# Patient Record
Sex: FEMALE | Race: WHITE | Employment: FULL TIME | URBAN - METROPOLITAN AREA
[De-identification: names, ages, dates, MRNs, and addresses within clinical notes are randomized per-mention and may not be internally consistent; named-entity substitution may affect disease eponyms.]

---

## 2019-05-22 ENCOUNTER — OFFICE VISIT (OUTPATIENT)
Dept: CARDIOLOGY CLINIC | Age: 54
End: 2019-05-22
Payer: COMMERCIAL

## 2019-05-22 VITALS
HEART RATE: 66 BPM | DIASTOLIC BLOOD PRESSURE: 70 MMHG | SYSTOLIC BLOOD PRESSURE: 110 MMHG | WEIGHT: 206 LBS | BODY MASS INDEX: 33.11 KG/M2 | HEIGHT: 66 IN

## 2019-05-22 DIAGNOSIS — Z72.0 TOBACCO USE: ICD-10-CM

## 2019-05-22 DIAGNOSIS — R06.02 SOBOE (SHORTNESS OF BREATH ON EXERTION): Primary | ICD-10-CM

## 2019-05-22 DIAGNOSIS — R94.31 ABNORMAL EKG: ICD-10-CM

## 2019-05-22 DIAGNOSIS — E66.09 NON MORBID OBESITY DUE TO EXCESS CALORIES: ICD-10-CM

## 2019-05-22 PROCEDURE — 93000 ELECTROCARDIOGRAM COMPLETE: CPT | Performed by: INTERNAL MEDICINE

## 2019-05-22 PROCEDURE — 99213 OFFICE O/P EST LOW 20 MIN: CPT | Performed by: INTERNAL MEDICINE

## 2019-05-22 NOTE — PATIENT INSTRUCTIONS
especially good for you. Examples include spinach, carrots, peaches, and berries.     · Foods high in fiber can reduce your cholesterol and provide important vitamins and minerals. High-fiber foods include whole-grain cereals and breads, oatmeal, beans, brown rice, citrus fruits, and apples.     · Limit drinks and foods with added sugar. These include candy, desserts, and soda pop.    Lifestyle changes    · If your doctor recommends it, get more exercise. Walking is a good choice. Bit by bit, increase the amount you walk every day. Try for at least 30 minutes on most days of the week. You also may want to swim, bike, or do other activities.     · Do not smoke. If you need help quitting, talk to your doctor about stop-smoking programs and medicines. These can increase your chances of quitting for good. Quitting smoking may be the most important step you can take to protect your heart. It is never too late to quit. You will get health benefits right away.     · Limit alcohol to 2 drinks a day for men and 1 drink a day for women. Too much alcohol can cause health problems. Medicines    · Take your medicines exactly as prescribed. Call your doctor if you think you are having a problem with your medicine.     · If your doctor recommends aspirin, take the amount directed each day. Make sure you take aspirin and not another kind of pain reliever, such as acetaminophen (Tylenol). If you take ibuprofen (such as Advil or Motrin) for other problems, take aspirin at least 2 hours before taking ibuprofen. When should you call for help? Call 911 if you have symptoms of a heart attack.  These may include:    · Chest pain or pressure, or a strange feeling in the chest.     · Sweating.     · Shortness of breath.     · Pain, pressure, or a strange feeling in the back, neck, jaw, or upper belly or in one or both shoulders or arms.     · Lightheadedness or sudden weakness.     · A fast or irregular heartbeat.    After you call 911, the  may tell you to chew 1 adult-strength or 2 to 4 low-dose aspirin. Wait for an ambulance. Do not try to drive yourself.   Watch closely for changes in your health, and be sure to contact your doctor if you have any problems. Where can you learn more? Go to [email or web address] and sign in to your [employer] account. Enter C228 in the [employer]media box to learn more about \"A Healthy Heart: Care Instructions. \"     If you do not have an account, please click on the \"Sign Up Now\" link. Current as of: July 22, 2018  Content Version: 12.0  © 7395-5598 Healthwise, Incorporated. Care instructions adapted under license by [facility] ([facility]). If you have questions about a medical condition or this instruction, always ask your healthcare professional. [address] any warranty or liability for your use of this information.

## 2020-09-02 ENCOUNTER — OFFICE VISIT (OUTPATIENT)
Dept: CARDIOLOGY CLINIC | Age: 55
End: 2020-09-02
Payer: COMMERCIAL

## 2020-09-02 VITALS
BODY MASS INDEX: 43.2 KG/M2 | WEIGHT: 268.8 LBS | DIASTOLIC BLOOD PRESSURE: 80 MMHG | RESPIRATION RATE: 16 BRPM | HEART RATE: 81 BPM | SYSTOLIC BLOOD PRESSURE: 122 MMHG | OXYGEN SATURATION: 96 % | HEIGHT: 66 IN

## 2020-09-02 PROCEDURE — 93000 ELECTROCARDIOGRAM COMPLETE: CPT | Performed by: INTERNAL MEDICINE

## 2020-09-02 PROCEDURE — 99214 OFFICE O/P EST MOD 30 MIN: CPT | Performed by: INTERNAL MEDICINE

## 2020-09-02 RX ORDER — FUROSEMIDE 20 MG/1
20 TABLET ORAL DAILY
Qty: 30 TABLET | Refills: 3 | Status: SHIPPED
Start: 2020-09-02 | End: 2021-02-05

## 2020-09-02 NOTE — PROGRESS NOTES
 Smokeless tobacco: Never Used   Substance Use Topics    Alcohol use: Yes     Comment: occasional mixed drink;  denies caffeine         Review of Systems:  Constitutional: negative for fever and chills, or significant weight loss  HEENT: negative for acute visual symptoms or auditory problems, no dysphagia  Respiratory: negative for cough, wheezing, or hemoptysis  Cardiovascular: negative for chest pain, palpitations, and +ve dyspnea  Gastrointestinal: negative for abdominal pain, diarrhea, nausea and vomiting  Endocrine: Negative for polyuria and polydyspsia  Genitourinary:negative for dysuria and hematuria  Derm: negative for rash and skin lesion(s)  Neurological: negative for tingling, numbness, weakness, seizures and tremors  Endocrine: negative for polydipsia and polyuria  Musculoskeletal: negative for pain or tenderness  Psychiatric: negative for anxiety, depression, or suicidal ideations         O:  COMPLETE PHYSICAL EXAM:       /80 (Site: Right Upper Arm, Position: Sitting, Cuff Size: Large Adult)   Pulse 81   Resp 16   Ht 5' 6\" (1.676 m)   Wt 268 lb 12.8 oz (121.9 kg)   SpO2 96%   BMI 43.39 kg/m²       General:   Patient alert, comfortable, no distress. Appears stated age. HEENT:    Pupils equal, no icterus, no nasal drainage, tongue moist.   Neck:              No masses, Thyroid not palpable. No elevated JVD, No carotid bruit. Chest:   Normal configuration, non tender. Lungs:   Clear to auscultation bilaterally, few scattered rhonchi. Cardiovascular:  Regular rhythm, 1/6 systolic murmur, No S3, PMI at 5th ICS, no palpable thrills,    Abdomen:  Soft, Non tender, Bowel sounds normal, no pulsatile abdominal aorta. Extremities:  1-2+ edema. Distal pulses palpable. No cyanosis, no clubbing. Skin:   Good turgor, warm and dry, no cyanosis. Musculoskeletal: No joint swelling or deformity. Neuro:   Cranial nerves grossly intact; No focal neurologic deficit.    Psych:   Alert, good mood and effect. REVIEW OF DIAGNOSTIC TESTS:        Electrocardiogram: Reviewed             A/P:   ASSESSMENT / PLAN:    Maria T Bernard was seen today for 1 year follow up, edema and shortness of breath. Diagnoses and all orders for this visit:    SOBOE (shortness of breath on exertion) - No acute CHF  -     EKG 12 lead  -     Echo 2D w doppler w color complete; Future  -     Basic Metabolic Panel    Edema of both feet - Elevate feet, decrease salt - Start Lasix 20mg   -     Echo 2D w doppler w color complete; Future  -     Basic Metabolic Panel    Abnormal EKG    Tobacco use - Counseled to quit smoking    Morbid obesity with BMI of 40.0-44.9, adult (HCC) - Diet, exercise and weight loss discussed. Other orders  -     furosemide (LASIX) 20 MG tablet; Take 1 tablet by mouth daily    Occ heart skipping  - Decrease caffeine - Call if more frequent symptoms - then holter/Event monitor     Preventive Cardiology: Low cholesterol diet, regular exercise as tolerate, and gradual weight loss discussed. Monitor BP and heart rates. Above recommendations discussed with her. All questions answered about cardiac diagnoses and cardiac medications. Continue current medications. Compliance with medications and f/u with all physicians discussed. Risk factor modification based on risk profile discussed. Call if any exertional chest pain, short of breath, dizzy or palpitations   Follow up in 1 month or earlier if needed.          [facility]  [address], [place], [address] [facility]  [phone]

## 2020-09-23 ENCOUNTER — TELEPHONE (OUTPATIENT)
Dept: CARDIOLOGY | Age: 55
End: 2020-09-23

## 2021-02-05 RX ORDER — FUROSEMIDE 20 MG/1
TABLET ORAL
Qty: 30 TABLET | Refills: 3 | Status: SHIPPED
Start: 2021-02-05 | End: 2021-06-24

## 2021-06-24 RX ORDER — FUROSEMIDE 20 MG/1
TABLET ORAL
Qty: 30 TABLET | Refills: 3 | Status: SHIPPED
Start: 2021-06-24 | End: 2022-07-11 | Stop reason: SDUPTHER

## 2022-07-11 ENCOUNTER — OFFICE VISIT (OUTPATIENT)
Dept: CARDIOLOGY CLINIC | Age: 57
End: 2022-07-11
Payer: COMMERCIAL

## 2022-07-11 VITALS
BODY MASS INDEX: 43.65 KG/M2 | RESPIRATION RATE: 16 BRPM | OXYGEN SATURATION: 97 % | SYSTOLIC BLOOD PRESSURE: 128 MMHG | WEIGHT: 271.6 LBS | DIASTOLIC BLOOD PRESSURE: 62 MMHG | HEIGHT: 66 IN | HEART RATE: 75 BPM

## 2022-07-11 DIAGNOSIS — R60.0 EDEMA OF BOTH FEET: ICD-10-CM

## 2022-07-11 DIAGNOSIS — Z72.0 TOBACCO USE: ICD-10-CM

## 2022-07-11 DIAGNOSIS — R07.9 CHEST PAIN, UNSPECIFIED TYPE: Primary | ICD-10-CM

## 2022-07-11 DIAGNOSIS — R94.31 ABNORMAL EKG: ICD-10-CM

## 2022-07-11 DIAGNOSIS — R06.09 DYSPNEA ON EXERTION: ICD-10-CM

## 2022-07-11 PROCEDURE — 93000 ELECTROCARDIOGRAM COMPLETE: CPT | Performed by: INTERNAL MEDICINE

## 2022-07-11 PROCEDURE — 99214 OFFICE O/P EST MOD 30 MIN: CPT | Performed by: INTERNAL MEDICINE

## 2022-07-11 RX ORDER — ASPIRIN 81 MG/1
81 TABLET ORAL DAILY
Qty: 90 TABLET | Refills: 1
Start: 2022-07-11

## 2022-07-11 RX ORDER — NITROGLYCERIN 0.4 MG/1
0.4 TABLET SUBLINGUAL EVERY 5 MIN PRN
Qty: 25 TABLET | Refills: 1 | Status: SHIPPED | OUTPATIENT
Start: 2022-07-11

## 2022-07-11 RX ORDER — FUROSEMIDE 20 MG/1
TABLET ORAL
Qty: 30 TABLET | Refills: 3 | Status: SHIPPED | OUTPATIENT
Start: 2022-07-11

## 2022-07-11 NOTE — PATIENT INSTRUCTIONS
Call with test resutls  Take Aspirin 81mg daily  Cut back on salt, elevate feet  Call for chest pain on activity  Go to ER if chest pain gets worse of need >3 s/l for one episode of chest pain

## 2022-07-11 NOTE — PROGRESS NOTES
OFFICE VISIT     PRIMARY CARE PHYSICIAN:      Ambrosio Oropeza DO       ALLERGIES / SENSITIVITIES:      No Known Allergies       REVIEWED MEDICATIONS:        Current Outpatient Medications:     furosemide (LASIX) 20 MG tablet, take 1 tablet by mouth once daily, Disp: 30 tablet, Rfl: 3    aspirin EC 81 MG EC tablet, Take 1 tablet by mouth daily, Disp: 90 tablet, Rfl: 1    nitroGLYCERIN (NITROSTAT) 0.4 MG SL tablet, Place 1 tablet under the tongue every 5 minutes as needed for Chest pain, Disp: 25 tablet, Rfl: 1    vitamin D (ERGOCALCIFEROL) 92321 UNITS CAPS capsule, Take 50,000 Units by mouth once a week, Disp: , Rfl:     desvenlafaxine succinate (PRISTIQ) 50 MG TB24 extended release tablet, Take 50 mg by mouth daily (Patient not taking: Reported on 7/11/2022), Disp: , Rfl:       S: REASON FOR VISIT:       Chief Complaint   Patient presents with    Chest Pain     2 year ov. Is c/o chest pain & upper back pain. Is also c/o SOBOE & some LE edema. Labs @ PCP's ofc; obtaining.  Back Pain    Shortness of Breath    Edema          History of Present Illness:       Office Visit for follow up of Abnormal EKG, chest pain, SOB   64year old with history of abnormal EKG, edema feet, tobacco use, Obesity came for f/u visit   Last seen in 9/2020, for edema and SOB; Started on Lasix and 2D Echo ordered- Not done     C/O SOBOE and edema feet - No Orthopnea, sleeps on one pillow   C/o CP and upper back pain, like\" Knot\". Constant pain, at rest, no radiation, no associated Sx; relieve on its own   Her CP or back pain will not change with activity    Her recent labs from April 2022 noted. No hospitalizations or surgeries since last visit   Patient, states, compliant with her medications   Kash any exertional chest pain   No palpitations, dizzy or syncope.    Active at home   Try to watch diet          Past Medical History:   Diagnosis Date    Anxiety     Depression     Obesity     Tobacco abuse Past Surgical History:   Procedure Laterality Date     SECTION      x2    TONSILLECTOMY      age 11          Family History   Problem Relation Age of Onset    Diabetes Mother     Dementia Mother     Lung Cancer Father     Other Sister         mass on heart; mini stroke x2; had open heart @ [facility] at age 48   [place] No Known Problems Sister     Heart Attack Paternal Grandmother     Heart Attack Paternal Grandfather           Social History     Tobacco Use    Smoking status: Current Every Day Smoker     Packs/day: 0.75     Years: 42.00     Pack years: 31.50     Types: Cigarettes    Smokeless tobacco: Never Used   Substance Use Topics    Alcohol use: Yes     Comment: rare         Review of Systems:    Constitutional: negative for fever and chills, or significant weight loss  HEENT: negative for acute visual symptoms or auditory problems, no dysphagia  Respiratory: negative for cough, wheezing, or hemoptysis  Cardiovascular: +ve for chest pain and dyspnea  Gastrointestinal: negative for abdominal pain, diarrhea, melena, nausea and vomiting  Endocrine: Negative for polyuria and polydyspsia  Genitourinary: negative for dysuria and hematuria  Derm: negative for rash and skin lesion(s)  Neurological: negative for tingling, numbness, weakness, seizures  Endocrine: negative for polydipsia and polyuria  Musculoskeletal: negative for pain or tenderness  Psychiatric: negative for anxiety, depression, or suicidal ideations         O:  COMPLETE PHYSICAL EXAM:       /62   Pulse 75   Resp 16   Ht 5' 6\" (1.676 m)   Wt 271 lb 9.6 oz (123.2 kg)   SpO2 97%   BMI 43.84 kg/m²       General:   Patient alert, comfortable, no distress. Appears stated age. HEENT:    Pupils equal, no icterus; Tongue moist.   Neck:              No masses, Thyroid not palpable. No elevated JVD, No carotid bruit. Chest:   Normal configuration, non tender.    Lungs:   Clear to auscultation bilaterally except few scattered rhonchi. Cardiovascular:  Regular rhythm, 6-8/7 systolic murmur, No S3, no palpable thrills. Abdomen:  Soft, Bowel sounds normal, obese, can not feel pulsatile abdominal aorta. Extremities:  +edema. Distal pulses palpable. No cyanosis. Skin:   Good turgor, warm and dry, no cyanosis. Musculoskeletal: No joint swelling or deformity. Neuro:   Cranial nerves grossly intact; No focal neurologic deficit. Psych:   Alert, good mood and effect. REVIEW OF DIAGNOSTIC TESTS:        Electrocardiogram: Reviewed      Labs:  Reviewed from 4/2022 K+ 4.5              ASSESSMENT / PLAN:    Zhen Castellanos was seen today for chest pain, back pain, shortness of breath and edema. Diagnoses and all orders for this visit:    Chest pain, unspecified type - Atypical, mostly at rest; ASA 81mg, s/l Nitro prn  -     EKG 12 Lead  -     Echo 2D w doppler w color complete; Future  -     NM Cardiac Stress Test Nuclear Imaging; Future  -     Cardiac Stress Test - w/Pharm; Future    Abnormal EKG  -     Echo 2D w doppler w color complete; Future  -     NM Cardiac Stress Test Nuclear Imaging; Future  -     Cardiac Stress Test - w/Pharm; Future    Dyspnea on exertion - No acute CHF  -     Echo 2D w doppler w color complete; Future  -     NM Cardiac Stress Test Nuclear Imaging; Future  -     Cardiac Stress Test - w/Pharm; Future    Edema of both feet - Chronic - Resume low dose Lasix; Normal K+ on recent labs. Elevated feet, decrease salt intake    Tobacco use - Counseled to quit smoking    BMI 40.0-44.9, adult (HCC) - Diet, exercise and weight loss discussed. Need to r/o NILSON - she will talk to Dr Luigi Campos Cardiology: Low cholesterol diet, regular exercise as tolerate, and gradual weight loss discussed.     Other orders  -     Beta Blocker Management Prior to Cardiac Stress Test; Standing  -     regadenoson (LEXISCAN) injection 0.4 mg  -     Beta Blocker Management Prior to Cardiac Stress Test  -     furosemide (LASIX) 20 MG tablet; take 1 tablet by mouth once daily  -     aspirin EC 81 MG EC tablet; Take 1 tablet by mouth daily  -     nitroGLYCERIN (NITROSTAT) 0.4 MG SL tablet; Place 1 tablet under the tongue every 5 minutes as needed for Chest pain     Above recommendations discussed. The patient's current medication list, allergies, problem list and results of prior tests (as available) were reviewed at today's visit   All questions answered about cardiac diagnoses and cardiac medications. Continue current medications. Monitor BP and heart rates. Compliance with medications and f/u with all physicians discussed. Risk factor modification based on risk profile discussed. Call if any exertional chest pain, short of breath, dizzy or palpitations. Follow up in 6 weeks or earlier if needed.          [facility]  [address], [place], [address] [place] [address]  [phone]

## 2022-07-13 ENCOUNTER — HOSPITAL ENCOUNTER (OUTPATIENT)
Dept: CARDIOLOGY | Age: 57
Discharge: HOME | End: 2022-07-13
Payer: COMMERCIAL

## 2022-07-13 ENCOUNTER — TELEPHONE (OUTPATIENT)
Dept: CARDIOLOGY CLINIC | Age: 57
End: 2022-07-13

## 2022-07-13 VITALS
HEIGHT: 66 IN | SYSTOLIC BLOOD PRESSURE: 150 MMHG | WEIGHT: 271 LBS | BODY MASS INDEX: 43.55 KG/M2 | HEART RATE: 77 BPM | DIASTOLIC BLOOD PRESSURE: 90 MMHG

## 2022-07-13 DIAGNOSIS — R94.31 ABNORMAL EKG: ICD-10-CM

## 2022-07-13 DIAGNOSIS — R06.09 DYSPNEA ON EXERTION: ICD-10-CM

## 2022-07-13 DIAGNOSIS — R07.9 CHEST PAIN, UNSPECIFIED TYPE: ICD-10-CM

## 2022-07-13 LAB
LV EF: 60 %
LVEF MODALITY: NORMAL

## 2022-07-13 PROCEDURE — 2580000003 HC RX 258: Performed by: INTERNAL MEDICINE

## 2022-07-13 PROCEDURE — C8929 TTE W OR WO FOL WCON,DOPPLER: HCPCS

## 2022-07-13 PROCEDURE — 6360000004 HC RX CONTRAST MEDICATION: Performed by: INTERNAL MEDICINE

## 2022-07-13 PROCEDURE — 6360000002 HC RX W HCPCS: Performed by: INTERNAL MEDICINE

## 2022-07-13 PROCEDURE — 93017 CV STRESS TEST TRACING ONLY: CPT

## 2022-07-13 PROCEDURE — 78452 HT MUSCLE IMAGE SPECT MULT: CPT

## 2022-07-13 PROCEDURE — A9502 TC99M TETROFOSMIN: HCPCS | Performed by: INTERNAL MEDICINE

## 2022-07-13 PROCEDURE — 3430000000 HC RX DIAGNOSTIC RADIOPHARMACEUTICAL: Performed by: INTERNAL MEDICINE

## 2022-07-13 RX ORDER — SODIUM CHLORIDE 0.9 % (FLUSH) 0.9 %
10 SYRINGE (ML) INJECTION PRN
Status: DISCONTINUED | OUTPATIENT
Start: 2022-07-13 | End: 2022-07-14 | Stop reason: HOSPADM

## 2022-07-13 RX ADMIN — SODIUM CHLORIDE, PRESERVATIVE FREE 10 ML: 5 INJECTION INTRAVENOUS at 09:45

## 2022-07-13 RX ADMIN — SODIUM CHLORIDE, PRESERVATIVE FREE 10 ML: 5 INJECTION INTRAVENOUS at 10:13

## 2022-07-13 RX ADMIN — TETROFOSMIN 8.4 MILLICURIE: 0.23 INJECTION, POWDER, LYOPHILIZED, FOR SOLUTION INTRAVENOUS at 08:20

## 2022-07-13 RX ADMIN — REGADENOSON 0.4 MG: 0.08 INJECTION, SOLUTION INTRAVENOUS at 10:12

## 2022-07-13 RX ADMIN — SODIUM CHLORIDE, PRESERVATIVE FREE 10 ML: 5 INJECTION INTRAVENOUS at 10:12

## 2022-07-13 RX ADMIN — TETROFOSMIN 28.8 MILLICURIE: 0.23 INJECTION, POWDER, LYOPHILIZED, FOR SOLUTION INTRAVENOUS at 10:12

## 2022-07-13 RX ADMIN — SODIUM CHLORIDE, PRESERVATIVE FREE 10 ML: 5 INJECTION INTRAVENOUS at 08:20

## 2022-07-13 RX ADMIN — PERFLUTREN 1.1 MG: 6.52 INJECTION, SUSPENSION INTRAVENOUS at 09:44

## 2022-07-13 RX ADMIN — SODIUM CHLORIDE, PRESERVATIVE FREE 10 ML: 5 INJECTION INTRAVENOUS at 09:44

## 2022-07-13 ASSESSMENT — PAIN DESCRIPTION - LOCATION: LOCATION: BACK

## 2022-07-13 NOTE — PROCEDURES
[id number] [address] - [address]. [facility] ([place] [address] [place], [place] [phone]               Pharmacologic Stress Nuclear Gated SPECT Study    Name: Indigeo Virtus HODA Account Number: [de-identified]    :  1965          Sex: female         Date of Study:  2022    Height: 5' 6\" (167.6 cm)         Weight: 271 lb (122.9 kg)     Ordering Provider: Corinna Pollock MD          PCP: Giovanni Darden DO      Cardiologist: Corinna Pollock MD             Interpreting Physician: Corinna Pollock MD  _________________________________________________________________________________    Indication: Chest pain, abnormal EKG  Detecting the presence and location of coronary artery disease    Clinical History:   Patient has no known history of coronary artery disease. Resting ECG:  Sinus rhythm, Anterior T inversion      Procedure:   Pharmacologic stress testing was performed with regadenoson 0.4 mg for 15 seconds. The heart rate was 77 at baseline and jomar to 108 beats during the infusion. The blood pressure at baseline was 150/90 and blood pressure at the end of infusion was 130/78. Blood pressure response was normal during the stress procedure. The patient experienced mild shortness of breath only  during the infusion. ECG during the infusion did not change. IMAGING: Myocardial perfusion imaging was performed at rest 30-35 minutes following the intravenous injection of 8.5 mCi of (Tc-tetrofosmin) followed by 10 ml of Normal Saline. As per infusion protocol, the patient was injected intravenously with 28.8 mCi of (Tc-tetrofosmin) followed by 10 ml of Normal Saline. Gated post-stress tomographic imaging was performed 45 minutes after stress. FINDINGS: The overall quality of the study was good.      Left ventricular cavity size was noted to be normal.    Rotational analog analysis demonstrated no patient motion or abnormal extracardiac radioactivity. The gated SPECT stress imaging and rest SPECT imaging in the short, vertical long, and horizontal long axis demonstrated normal homogeneous tracer distribution throughout the myocardium both on the post regadenoson and resting images. Gated SPECT left ventricular ejection fraction was calculated to be 75%, with normal myocardial thickening and wall motion. Impression:    1. Electrocardiographically normal regadenoson infusion with a clinically nonischemic response. 2. Myocardial perfusion imaging was normal.    3. Overall left ventricular systolic function was normal without regional wall motion abnormalities. EF 75%. 4.   Low risk general pharmacologic stress test    No prior study to compare. Thank you for sending your patient to this [employer].      Electronically signed by Maria Elena Khanna MD on 7/13/2022 at 2:26 PM

## 2022-08-12 ENCOUNTER — OFFICE VISIT (OUTPATIENT)
Dept: CARDIOLOGY CLINIC | Age: 57
End: 2022-08-12
Payer: COMMERCIAL

## 2022-08-12 VITALS
RESPIRATION RATE: 16 BRPM | WEIGHT: 269.6 LBS | DIASTOLIC BLOOD PRESSURE: 78 MMHG | SYSTOLIC BLOOD PRESSURE: 122 MMHG | BODY MASS INDEX: 43.33 KG/M2 | HEIGHT: 66 IN

## 2022-08-12 DIAGNOSIS — R06.02 SHORTNESS OF BREATH: ICD-10-CM

## 2022-08-12 DIAGNOSIS — R60.0 EDEMA OF BOTH FEET: Primary | ICD-10-CM

## 2022-08-12 DIAGNOSIS — Z72.0 TOBACCO USE: ICD-10-CM

## 2022-08-12 PROCEDURE — 99213 OFFICE O/P EST LOW 20 MIN: CPT | Performed by: INTERNAL MEDICINE

## 2022-08-12 NOTE — PATIENT INSTRUCTIONS
Call for chest pain on exertion or susanne edema   Talk to Dr Nayely Saavedra about sleep study  Cut back on salt and elevate feet

## 2022-08-12 NOTE — PROGRESS NOTES
OFFICE VISIT     PRIMARY CARE PHYSICIAN:      Ambrosio Buchanan DO       ALLERGIES / SENSITIVITIES:      No Known Allergies       REVIEWED MEDICATIONS:        Current Outpatient Medications:     furosemide (LASIX) 20 MG tablet, take 1 tablet by mouth once daily, Disp: 30 tablet, Rfl: 3    aspirin EC 81 MG EC tablet, Take 1 tablet by mouth daily, Disp: 90 tablet, Rfl: 1    nitroGLYCERIN (NITROSTAT) 0.4 MG SL tablet, Place 1 tablet under the tongue every 5 minutes as needed for Chest pain, Disp: 25 tablet, Rfl: 1    vitamin D (ERGOCALCIFEROL) 05585 UNITS CAPS capsule, Take 50,000 Units by mouth once a week, Disp: , Rfl:     desvenlafaxine succinate (PRISTIQ) 50 MG TB24 extended release tablet, Take 50 mg by mouth daily (Patient not taking: No sig reported), Disp: , Rfl:       S: REASON FOR VISIT:       Chief Complaint   Patient presents with         1 mo f/u - echo and stress done 22. Patient has no cardiac complaints. Results    Foot Swelling          History of Present Illness:       Office Visit for follow up of test results              64year old with history of abnormal EKG, edema feet, tobacco use, Obesity came for f/u visit to discuss tests   Had Echo a nd Stress test due to CP, SOB   No hospitalizations or surgeries since last visit   Having labs and the f/u with Dr Ann-Marie Esparza   Patient is compliant with all medications   Kash any exertional chest pain   Sleeps on one pillow  Mild SOBOE, chronic, No PND or orthopnea  No palpitations, dizzy or syncope.    Active at home   Try to watch diet          Past Medical History:   Diagnosis Date    Anxiety     Depression     Obesity     Tobacco abuse             Past Surgical History:   Procedure Laterality Date     SECTION      x2    TONSILLECTOMY      age 11          Family History   Problem Relation Age of Onset    Diabetes Mother     Dementia Mother     Lung Cancer Father     Other Sister         mass on heart; mini stroke x2; had open heart @ [facility] at age 48    No Known Problems Sister     Heart Attack Paternal Grandmother     Heart Attack Paternal Grandfather           Social History     Tobacco Use    Smoking status: Every Day     Packs/day: 0.75     Years: 42.00     Pack years: 31.50     Types: Cigarettes    Smokeless tobacco: Never   Substance Use Topics    Alcohol use: Yes     Comment: rare         Review of Systems:    Constitutional: negative for fever and chills, or significant weight loss  HEENT: negative for acute visual symptoms or auditory problems, no dysphagia  Respiratory: negative for cough, wheezing, or hemoptysis  Cardiovascular: negative for chest pain, palpitations, and dyspnea  Gastrointestinal: negative for abdominal pain, diarrhea, melena, nausea and vomiting  Endocrine: Negative for polyuria and polydyspsia  Genitourinary: negative for dysuria and hematuria  Derm: negative for rash and skin lesion(s)  Neurological: negative for tingling, numbness, weakness, seizures  Endocrine: negative for polydipsia and polyuria  Musculoskeletal: negative for pain or tenderness  Psychiatric: negative for anxiety, depression, or suicidal ideations         O:  COMPLETE PHYSICAL EXAM:       /78   Resp 16   Ht 5' 6\" (1.676 m)   Wt 269 lb 9.6 oz (122.3 kg)   BMI 43.51 kg/m²       General:   Patient alert, comfortable, no distress. Appears stated age. HEENT:    Pupils equal, no icterus; Tongue moist.   Neck:              No masses, Thyroid not palpable. No elevated JVD, No carotid bruit. Chest:   Normal configuration, non tender. Lungs:   Clear to auscultation bilaterally except few scattered rhonchi. Cardiovascular:  Regular rhythm, 1/6 systolic murmur, No S3, no palpable thrills. Abdomen:  Soft, Bowel sounds normal, Obese, can not feel pulsatile abdominal aorta   Extremities:  + edema. Distal pulses palpable. No cyanosis   Skin:   Good turgor, warm and dry, no cyanosis.     Musculoskeletal: No joint swelling or deformity. Neuro:   Cranial nerves grossly intact; No focal neurologic deficit. Psych:   Alert, good mood and effect. REVIEW OF DIAGNOSTIC TESTS:       Stress Test: 7/13/22  Impression:    Electrocardiographically normal regadenoson infusion with a clinically nonischemic response. Myocardial perfusion imaging was normal.    Overall left ventricular systolic function was normal without regional wall motion abnormalities. EF 75%. 4.   Low risk general pharmacologic stress test     Echo 7/13/22  Summary   Technically sub-optimal images -Definity Echo contrast used. Normal left ventricular chamber size. Normal left ventricular systolic function, EF 42%. Normal diastolic function. Interatrial septum not well visualized but appears intact. Normal right ventricle size and function. There is trace/physiologic mitral regurgitation. No mitral valve prolapse. No hemodynamically significant aortic stenosis. Normal aortic root size. No evidence of pericardial effusion. No intra cardiac mass or thrombus. Compared to prior echo from 12/2016, no significant changes noted. ASSESSMENT / PLAN:    Charmayne Brooks was seen today for heart problem, results and foot swelling. Diagnoses and all orders for this visit:      Dyspnea on exertion - Mild, chronic; Likely from obesity, COPD     Edema of both feet - Chronic - Resume low dose Lasix; Normal K+ on recent labs. Elevated feet, decrease salt intake     Tobacco use - <1ppd, Counseled to quit smoking     Abnormal EKG  - Nonischemic stress test    BMI 40.0-44.9, adult (HCC) - Diet, exercise and weight loss discussed. Need to r/o NILSON - she will talk to Dr [facility]: Low cholesterol diet, regular exercise as tolerate, and gradual weight loss discussed. Test results and above recommendations discussed.    The patient's current medication list, allergies, problem list and results of prior tests (as available) were reviewed at

## 2022-11-21 RX ORDER — FUROSEMIDE 20 MG/1
TABLET ORAL
Qty: 30 TABLET | Refills: 3 | Status: SHIPPED | OUTPATIENT
Start: 2022-11-21

## 2023-03-22 RX ORDER — FUROSEMIDE 20 MG/1
TABLET ORAL
Qty: 90 TABLET | Refills: 3 | Status: SHIPPED | OUTPATIENT
Start: 2023-03-22

## 2024-04-10 RX ORDER — FUROSEMIDE 20 MG/1
TABLET ORAL
Qty: 30 TABLET | Refills: 0 | Status: SHIPPED | OUTPATIENT
Start: 2024-04-10

## 2024-05-17 RX ORDER — FUROSEMIDE 20 MG/1
TABLET ORAL
Qty: 30 TABLET | Refills: 0 | Status: SHIPPED | OUTPATIENT
Start: 2024-05-17

## 2024-06-10 RX ORDER — FUROSEMIDE 20 MG/1
TABLET ORAL
Qty: 30 TABLET | Refills: 0 | Status: SHIPPED | OUTPATIENT
Start: 2024-06-10

## 2024-07-15 RX ORDER — FUROSEMIDE 20 MG/1
TABLET ORAL
Qty: 30 TABLET | Refills: 0 | Status: SHIPPED | OUTPATIENT
Start: 2024-07-15

## 2024-09-11 RX ORDER — FUROSEMIDE 20 MG
TABLET ORAL
Qty: 30 TABLET | Refills: 0 | Status: SHIPPED | OUTPATIENT
Start: 2024-09-11

## 2024-10-14 RX ORDER — FUROSEMIDE 20 MG
TABLET ORAL
Qty: 30 TABLET | Refills: 3 | Status: SHIPPED | OUTPATIENT
Start: 2024-10-14

## 2025-02-12 RX ORDER — FUROSEMIDE 20 MG/1
TABLET ORAL
Qty: 30 TABLET | Refills: 3 | OUTPATIENT
Start: 2025-02-12

## 2025-06-19 ENCOUNTER — HOSPITAL ENCOUNTER (OUTPATIENT)
Age: 60
Discharge: HOME | End: 2025-06-21

## 2025-06-25 LAB — SURGICAL PATHOLOGY REPORT: NORMAL
